# Patient Record
Sex: MALE | Race: OTHER | URBAN - METROPOLITAN AREA
[De-identification: names, ages, dates, MRNs, and addresses within clinical notes are randomized per-mention and may not be internally consistent; named-entity substitution may affect disease eponyms.]

---
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
## 2024-08-15 NOTE — PROGRESS NOTE BEHAVIORAL HEALTH - IMPULSE CONTROL
Please enter lab orders for the patient's upcoming physical appointment.     Physical scheduled:   Your appointments       Date & Time Appointment Department ([place])    Sep 16, 2024 12:00 PM CDT Physical - Established with Ronan Salinas MD [facility] ([facility])              [facility]  [address]  [facility] [address]  [phone]           Preferred lab: [facility] ([facility])     The patient has been notified to complete fasting labs prior to their physical appointment.     
Normal

## 2025-07-15 ENCOUNTER — EMERGENCY (EMERGENCY)
Facility: HOSPITAL | Age: 20
LOS: 0 days | Discharge: HOME | End: 2025-07-15
Attending: EMERGENCY MEDICINE
Payer: MEDICAID

## 2025-07-15 VITALS
HEART RATE: 71 BPM | OXYGEN SATURATION: 98 % | DIASTOLIC BLOOD PRESSURE: 68 MMHG | RESPIRATION RATE: 20 BRPM | WEIGHT: 76.5 LBS | SYSTOLIC BLOOD PRESSURE: 110 MMHG | TEMPERATURE: 98 F

## 2025-07-15 DIAGNOSIS — R63.4 ABNORMAL WEIGHT LOSS: ICD-10-CM

## 2025-07-15 DIAGNOSIS — F41.9 ANXIETY DISORDER, UNSPECIFIED: ICD-10-CM

## 2025-07-15 DIAGNOSIS — F32.A DEPRESSION, UNSPECIFIED: ICD-10-CM

## 2025-07-15 DIAGNOSIS — R06.02 SHORTNESS OF BREATH: ICD-10-CM

## 2025-07-15 PROCEDURE — 71046 X-RAY EXAM CHEST 2 VIEWS: CPT | Mod: 26

## 2025-07-15 PROCEDURE — 71046 X-RAY EXAM CHEST 2 VIEWS: CPT

## 2025-07-15 PROCEDURE — 93010 ELECTROCARDIOGRAM REPORT: CPT

## 2025-07-15 PROCEDURE — 99283 EMERGENCY DEPT VISIT LOW MDM: CPT | Mod: 25

## 2025-07-15 PROCEDURE — 99284 EMERGENCY DEPT VISIT MOD MDM: CPT

## 2025-07-15 PROCEDURE — 93005 ELECTROCARDIOGRAM TRACING: CPT

## 2025-07-15 RX ORDER — HYDROXYZINE HYDROCHLORIDE 25 MG/1
25 TABLET, FILM COATED ORAL ONCE
Refills: 0 | Status: COMPLETED | OUTPATIENT
Start: 2025-07-15 | End: 2025-07-15

## 2025-07-15 RX ORDER — HYDROXYZINE HYDROCHLORIDE 25 MG/1
50 TABLET, FILM COATED ORAL ONCE
Refills: 0 | Status: DISCONTINUED | OUTPATIENT
Start: 2025-07-15 | End: 2025-07-15

## 2025-07-15 RX ADMIN — HYDROXYZINE HYDROCHLORIDE 25 MILLIGRAM(S): 25 TABLET, FILM COATED ORAL at 03:21

## 2025-07-15 NOTE — ED ADULT NURSE NOTE - OBJECTIVE STATEMENT
pt c/o shortness of breath x a year and a half, worsening past 2 months. pt denies smoking, hx anxiety, pt endorses feeling anxiety.

## 2025-07-15 NOTE — ED PROVIDER NOTE - PATIENT PORTAL LINK FT
You can access the FollowMyHealth Patient Portal offered by [facility] by registering at the following website: [email or web address]. By joining [employer]’s FollowMyHealth portal, you will also be able to view your health information using other applications (apps) compatible with our system.

## 2025-07-15 NOTE — ED ADULT NURSE NOTE - CHIEF COMPLAINT
Patient is a 63-year-old female with type 2 diabetes here for follow-up.  She continues on metformin 1000 mg twice daily, sitagliptin 100 mg daily along with nightly basiglar 22 units.  Fasting morning glucose readings are generally 130-135.  She sporadically checks random glucose readings.  A1c is 7.2%.  She continues on ARB as well as statin. No hypoglycemic episodes.    The patient is a 19y Male complaining of shortness of breath.

## 2025-07-15 NOTE — ED PROVIDER NOTE - PHYSICAL EXAMINATION
Constitutional: Well developed, well nourished, no acute distress  Head: Normocephalic, Atraumatic  Eyes: PERRLA, EOMI, conjunctiva and sclera WNL  ENT: Moist mucous membranes, no rhinorrhea, TM clear B/L  Neck: Supple, Nontender,    Respiratory: Normal chest excursion with respiration; Breath sounds clear and equal B/L; No wheezes, rales, or rhonchi   Cardiovascular: RRR; Normal S1, S2; No murmurs, rubs or gallops   ABD/GI:  ; Nondistended; Nontender; No guarding, rigidity or rebound;    EXT/MS: Moving all extremities; Distal pulses 2+ B/L;    Skin: Normal for age and race; Warm and dry; No rash  Neurologic: AAO x 4; CN 2-12 intact; Normal motor and sensory function  Psychiatric: Anxious

## 2025-07-15 NOTE — ED PROVIDER NOTE - OBJECTIVE STATEMENT
19-year-old male with past med history of anxiety, depression presenting with shortness of breath for the past year and a half.  Patient reports he went to ED a year and half ago with similar symptoms and was prescribed a 7-day course of antibiotics.  Patient reports symptoms improved for few months post antibiotics but returned.  Patient states over the past 2 months his symptoms have been worsening.  Patient states he was seeing a psychiatrist and on medication for anxiety presenting was 17th but has not followed up since then.  Patient currently not on any medication.  Patient reports he feels tightness in his chest when he tries to breathe and some tightness in his throat has been going on for the past 2 months.  Denies any trauma.  No fevers, sore throat, ear pain, cough, congestion, abdominal pain, nausea, vomiting, diarrhea constipation,  symptoms.  Patient denies any SI/HI.  No auditory or visual hallucinations.  Patient endorses feeling anxious.

## 2025-07-15 NOTE — ED PROVIDER NOTE - PROGRESS NOTE DETAILS
Had shared decision making with patient, offered outpatient psychiatry versus telepsych.  Patient opting to see psychiatry outpatient.

## 2025-07-15 NOTE — ED ADULT NURSE NOTE - CHIEF COMPLAINT QUOTE
Presents to ED c/o shortness of breath x a year and a half. Pt sts it has worsened this month. Pt denies smoking, hx of respiratory disorders.

## 2025-07-15 NOTE — ED PROVIDER NOTE - NSFOLLOWUPINSTRUCTIONS_ED_ALL_ED_FT
Our Emergency Department Referral Coordinators will be reaching out to you in the next 24-48 hours from 9:00am to 5:00pm with a follow up appointment. Please expect a phone call from the hospital in that time frame. If you do not receive a call or if you have any questions or concerns, you can reach them at [phone]    Follow-up with psychiatry      Anxiety    WHAT YOU NEED TO KNOW:    What do I need to know about anxiety? Anxiety is a condition that causes you to feel extremely worried or nervous. The feelings are so strong that they can cause problems with your daily activities or sleep. Anxiety may be triggered by something you fear, or it may happen without a cause. Family or work stress, smoking, caffeine, and alcohol can increase your risk for anxiety. Certain medicines or health conditions can also increase your risk. Anxiety can become a long-term condition if it is not managed or treated.    What are the signs and symptoms of anxiety?    Fatigue or muscle tightness    Shaking, restlessness, or irritability    Problems focusing    Trouble sleeping    Feeling jumpy, easily startled, or dizzy    Rapid heartbeat or shortness of breath  How is anxiety diagnosed? Tell your healthcare provider when your symptoms began and what triggers them. Tell your provider if anxiety affects your daily activities. Your provider will also ask about your medical history and if you have family members with a similar condition. Tell your provider about your past and current alcohol, nicotine, or drug use. Any of these may worsen anxiety.    How is anxiety treated? Treatment depends on how severe your symptoms are. The following are common treatments for anxiety:    Cognitive behavioral therapy (CBT) teaches you how to identify and change negative thought patterns.    Anxiety or antidepressant medicine may help relieve or prevent anxiety. You may need to take the medicine for several weeks before you begin to feel better. Tell your healthcare provider about any side effects or problems you have with your medicine. The type or amount of medicine may need to be changed. Medicines are usually used along with therapy.  What can I do to manage anxiety?    Talk to someone about your anxiety. Your healthcare provider may suggest counseling. You might feel more comfortable talking with a friend or family member about your anxiety. Choose someone you know will be supportive and encouraging.    Get regular physical activity. Physical activity can lower your stress, improve your mood, and help you sleep better. Work with your healthcare provider to develop a plan that you enjoy.   FAMILY WALKING FOR EXERCISE      Create a regular sleep schedule. A routine can help you relax before bed. Listen to music, read, or do yoga. Try to go to bed and wake up at the same time every day. Sleep is important for emotional health.    Do activities you enjoy. Spend time with friends, or do something fun. Choose activities you are familiar with or comfortable doing. This may help prevent anxiety.    Practice deep breathing. Deep breathing can help you relax when you feel anxious. Focus on taking slow, deep breaths several times a day, or during an anxiety attack. Breathe in through your nose and out through your mouth. Deep breathing combined with meditation or listening to music may help you feel calmer.    Do not smoke. Nicotine and other chemicals in cigarettes and cigars can increase anxiety. Ask your healthcare provider for information if you currently smoke and need help to quit. E-cigarettes or smokeless tobacco still contain nicotine. Talk to your healthcare provider before you use these products.    Do not have caffeine. Caffeine can make your symptoms worse. Do not have foods or drinks that are meant to increase your energy level.    Do not drink alcohol or use drugs. Alcohol and drugs can worsen anxiety or make it hard to manage. Talk to your therapist or healthcare provider if you need help to quit.  Wellness Tips  The following resources are available at any time to help you, if needed:    Contact a suicide prevention organization:  For the 98 Suicide and Crisis Lifeline:  Call or text [facility]    Send a chat on https://[employer].org/chat    Call 1-037-535-9061 (1-800-273-TALK)    For the Suicide Hotline, call 1-179.521.1333 (6-305-FJIEYZE)    For a list of international numbers: https://save.org/find-help/international-resources/  Where can I find more information or support?    [employer]  [address]  [place]  Phone: 1-184.106.8124  Phone: 1-634.130.3973  Web Address: http://www.[employer].org  988 Suicide and Crisis Lifeline  [address]  [place], [place]  Phone: 1-215-203  Web Address: http://www.suicidepreventionlifeline.org OR https://[employer]/chat/  Call your local emergency number (911 in the [place]) if:    You have chest pain, tightness, or heaviness that may spread to your shoulders, arms, jaw, neck, or back.    You think about harming yourself or someone else.  When should I call my doctor?    Your symptoms get worse or do not get better with treatment.    Your anxiety keeps you from doing your regular daily activities.    You have new symptoms since your last visit.    You have questions or concerns about your condition or care.  CARE AGREEMENT:    You have the right to help plan your care. Learn about your health condition and how it may be treated. Discuss treatment options with your healthcare providers to decide what care you want to receive. You always have the right to refuse treatment.

## 2025-07-15 NOTE — CHART NOTE - NSCHARTNOTEFT_GEN_A_CORE
first number listed not in service, second number is wrong number, last number in chart just rings 7/15 - DK ( Referral)

## 2025-07-15 NOTE — ED PROVIDER NOTE - CLINICAL SUMMARY MEDICAL DECISION MAKING FREE TEXT BOX
20 yo M, hx of anxiety and depression, formerly on meds but no longer and no longer following with psychiatrist or therapist here for assessment of dyspnea -- patient states he has had symptoms over the last 2 years, worse over the last 2 months. Describes inability to take a deep breath due to tightness in throat and chest, worse with eating. No associated chest pain, fever, chills, cough, LE edema.     No SI, HI, auditory or visual hallucination.    Patient has normal VS, clear lungs, RRR, soft, NT, ND abdomen, no LE edema. He is thin,  22kg weight loss from last visit -- discussed this with patient, he admits to avoiding food because he is afraid that it will make his anxiety/dyspnea worse. Not intentional. Patient has good insight into his symptoms, realizes they are likely 2/2 anxiety.    EKG NSR, CXR without infiltrate, ptx.    Patient is PERC negative, no ischemic changes on EKG, CXR without infiltrate, ptx.     Patient needs urgent but not emergent/involuntary psych eval -- offered telepsych eval for consideration of voluntary admission but patient declined.     No signs of acute cardiopulmonary disease. Will dc with outpatient mental health follow up, sx monitoring, return precautions, follow up.